# Patient Record
Sex: MALE | Race: WHITE | NOT HISPANIC OR LATINO | Employment: OTHER | URBAN - METROPOLITAN AREA
[De-identification: names, ages, dates, MRNs, and addresses within clinical notes are randomized per-mention and may not be internally consistent; named-entity substitution may affect disease eponyms.]

---

## 2017-08-22 PROBLEM — M50.90 CERVICAL DISC DISEASE: Status: ACTIVE | Noted: 2017-08-22

## 2017-08-22 PROBLEM — G95.0: Status: ACTIVE | Noted: 2017-08-22

## 2017-08-22 PROBLEM — R94.09 ABNORMAL TILT TABLE TEST: Status: ACTIVE | Noted: 2017-08-22

## 2017-08-22 PROBLEM — R79.89 LOW SERUM CORTISOL LEVEL: Status: ACTIVE | Noted: 2017-08-22

## 2017-08-22 PROBLEM — M51.9 LUMBAR DISC DISEASE: Status: ACTIVE | Noted: 2017-08-22

## 2017-11-08 ENCOUNTER — TELEPHONE (OUTPATIENT)
Dept: NEUROSURGERY | Facility: CLINIC | Age: 22
End: 2017-11-08

## 2017-11-08 NOTE — TELEPHONE ENCOUNTER
----- Message from Mag Herr sent at 11/8/2017 12:07 PM CST -----  Regarding: Urgent Appointment Request From Dr. Morenita Hdez  Contact: [phone]  Good Afternoon,    Dr. Hdez would like to speak with Dr. Ruiz regarding her son Alvin's  current condition. The patient has an upper brain stem lesion and saw  Dr. Ruiz a few years ago. He has recently experienced  several epileptic episodes. Dr. Hdez can best be reached at [phone].    Thank you,  Mag Herr   [facility]  [phone]

## 2017-11-29 ENCOUNTER — INITIAL CONSULT (OUTPATIENT)
Dept: NEUROSURGERY | Facility: CLINIC | Age: 22
End: 2017-11-29
Payer: COMMERCIAL

## 2017-11-29 VITALS
WEIGHT: 288.69 LBS | SYSTOLIC BLOOD PRESSURE: 133 MMHG | HEIGHT: 73 IN | HEART RATE: 100 BPM | BODY MASS INDEX: 38.26 KG/M2 | DIASTOLIC BLOOD PRESSURE: 86 MMHG

## 2017-11-29 DIAGNOSIS — M50.90 CERVICAL DISC DISEASE: ICD-10-CM

## 2017-11-29 DIAGNOSIS — R41.3 MEMORY DISORDER: ICD-10-CM

## 2017-11-29 DIAGNOSIS — G95.0: Primary | ICD-10-CM

## 2017-11-29 DIAGNOSIS — R94.09 ABNORMAL TILT TABLE TEST: ICD-10-CM

## 2017-11-29 DIAGNOSIS — M79.7 FIBROMYALGIA: ICD-10-CM

## 2017-11-29 DIAGNOSIS — F68.A MUNCHAUSEN'S SYNDROME BY PROXY: ICD-10-CM

## 2017-11-29 DIAGNOSIS — S06.9X0S TRAUMATIC BRAIN INJURY, WITHOUT LOSS OF CONSCIOUSNESS, SEQUELA: ICD-10-CM

## 2017-11-29 DIAGNOSIS — M51.9 LUMBAR DISC DISEASE: ICD-10-CM

## 2017-11-29 PROBLEM — S06.9XAA TBI (TRAUMATIC BRAIN INJURY): Status: ACTIVE | Noted: 2017-11-29

## 2017-11-29 PROCEDURE — 99999 PR PBB SHADOW E&M-EST. PATIENT-LVL III: CPT | Mod: PBBFAC,,, | Performed by: NEUROLOGICAL SURGERY

## 2017-11-29 PROCEDURE — 99245 OFF/OP CONSLTJ NEW/EST HI 55: CPT | Mod: S$GLB,,, | Performed by: NEUROLOGICAL SURGERY

## 2017-11-29 RX ORDER — SULFAMETHOXAZOLE AND TRIMETHOPRIM 800; 160 MG/1; MG/1
1 TABLET ORAL
COMMUNITY
End: 2018-07-12 | Stop reason: ALTCHOICE
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
## 2017-11-29 NOTE — LETTER
December 5, 2017      Jyotsna Iyer MD  [address] [address] [facility] [place] [address]           [facility]  [address] [facility] [address] [place] [address]  Phone: [phone]  Fax: [phone]          Patient: Alvin Hdez   MR Number: [id number]   YOB: 1995   Date of Visit: 11/29/2017       Dear Dr. Jyotsna Iyer:    Thank you for referring Alvin Hdez to me for evaluation. Attached you will find relevant portions of my assessment and plan of care.    If you have questions, please do not hesitate to call me. I look forward to following Alvin Hdez along with you.    Sincerely,    Chelle Rosario  CC:  No Recipients    If you would like to receive this communication electronically, please contact [email or web address] or [phone] to request more information on [facility] Link access.    For providers and/or their staff who would like to refer a patient to [facility], please contact us through our one-stop-shop provider referral line, [facility], at [phone].    If you feel you have received this communication in error or would no longer like to receive these types of communications, please e-mail [email or web address]

## 2018-01-15 PROBLEM — F68.A: Status: ACTIVE | Noted: 2018-01-15

## 2019-07-24 PROBLEM — Z91.199 NO-SHOW FOR APPOINTMENT: Status: ACTIVE | Noted: 2019-07-24

## 2019-11-19 PROBLEM — Z91.199 NO-SHOW FOR APPOINTMENT: Status: RESOLVED | Noted: 2019-07-24 | Resolved: 2019-11-19

## 2019-11-19 PROBLEM — G61.81 CIDP (CHRONIC INFLAMMATORY DEMYELINATING POLYNEUROPATHY): Status: ACTIVE | Noted: 2019-11-19

## 2020-01-13 ENCOUNTER — TELEPHONE (OUTPATIENT)
Dept: NEUROLOGY | Facility: CLINIC | Age: 25
End: 2020-01-13

## 2020-01-13 NOTE — TELEPHONE ENCOUNTER
----- Message from Candy Jeffrey sent at 1/13/2020  9:56 AM CST -----  Type: Needs Medical Advice    Who Called:  Morenita Teixeira - mom  Best Call Back Number: [phone]  Additional Information: patient referred by Glenny dodson, please contact regarding scheduleing as a new patient

## 2020-01-13 NOTE — TELEPHONE ENCOUNTER
Returned call to pt mom and scheduled consult with Dr. Whitaker in a cancellation for tmw. Date/time/location confirmed. Verbalized understanding.

## 2020-01-14 ENCOUNTER — OFFICE VISIT (OUTPATIENT)
Dept: NEUROLOGY | Facility: CLINIC | Age: 25
End: 2020-01-14
Payer: COMMERCIAL

## 2020-01-14 VITALS
DIASTOLIC BLOOD PRESSURE: 87 MMHG | RESPIRATION RATE: 20 BRPM | BODY MASS INDEX: 40.31 KG/M2 | SYSTOLIC BLOOD PRESSURE: 146 MMHG | WEIGHT: 304.13 LBS | HEIGHT: 73 IN | HEART RATE: 82 BPM

## 2020-01-14 DIAGNOSIS — R53.1 WEAKNESS: ICD-10-CM

## 2020-01-14 DIAGNOSIS — R53.83 FATIGUE, UNSPECIFIED TYPE: ICD-10-CM

## 2020-01-14 DIAGNOSIS — M79.7 FIBROMYALGIA: Primary | ICD-10-CM

## 2020-01-14 DIAGNOSIS — R63.5 WEIGHT GAIN: ICD-10-CM

## 2020-01-14 PROCEDURE — 99999 PR PBB SHADOW E&M-EST. PATIENT-LVL IV: CPT | Mod: PBBFAC,,, | Performed by: PSYCHIATRY & NEUROLOGY

## 2020-01-14 PROCEDURE — 99204 PR OFFICE/OUTPT VISIT, NEW, LEVL IV, 45-59 MIN: ICD-10-PCS | Mod: S$GLB,,, | Performed by: PSYCHIATRY & NEUROLOGY

## 2020-01-14 PROCEDURE — 99999 PR PBB SHADOW E&M-EST. PATIENT-LVL IV: ICD-10-PCS | Mod: PBBFAC,,, | Performed by: PSYCHIATRY & NEUROLOGY

## 2020-01-14 PROCEDURE — 99204 OFFICE O/P NEW MOD 45 MIN: CPT | Mod: S$GLB,,, | Performed by: PSYCHIATRY & NEUROLOGY

## 2020-01-14 RX ORDER — GUAIFENESIN 1200 MG
TABLET, EXTENDED RELEASE 12 HR ORAL
COMMUNITY
End: 2021-02-19
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
## 2020-01-14 NOTE — LETTER
January 30, 2020      Glenny Bass MD  [address] [address]           [facility] Neurology  [address] [place] [address]  Phone: [phone]  Fax: [phone]          Patient: Alvin Hdez   MR Number: [id number]   YOB: 1995   Date of Visit: 1/14/2020       Dear Dr. Glenny Bass:    Thank you for referring Alvin Hdez to me for evaluation. Attached you will find relevant portions of my assessment and plan of care.    If you have questions, please do not hesitate to call me. I look forward to following Alvin Hdez along with you.    Sincerely,    Chelle Rosario  CC:  No Recipients    If you would like to receive this communication electronically, please contact [email or web address] or [phone] to request more information on [facility] Link access.    For providers and/or their staff who would like to refer a patient to [facility], please contact us through our one-stop-shop provider referral line, [facility] , at [phone].    If you feel you have received this communication in error or would no longer like to receive these types of communications, please e-mail [email or web address]

## 2020-01-14 NOTE — PATIENT INSTRUCTIONS
Would advise seeing an endocrinologist before committing to steroids.    Will try Savella for management of pain.  It can take several weeks to start working.  It is a slow titration to avoid side effects.  Possible side effects include nausea, insomnia, fatigue, hypertension, flushing, weight loss, appetite decrease, libido decrease, dry mouth.

## 2020-01-23 ENCOUNTER — TELEPHONE (OUTPATIENT)
Dept: NEUROLOGY | Facility: CLINIC | Age: 25
End: 2020-01-23

## 2020-01-23 NOTE — TELEPHONE ENCOUNTER
Returned call to pt mom; she stated that pt was supposed to have consult with endocrinology; she called to make appt and was told the referral was closed; Dr Whitaker can you please redo the referral? thanks

## 2020-01-23 NOTE — TELEPHONE ENCOUNTER
----- Message from Hilton Castillo sent at 1/23/2020 11:36 AM CST -----  Contact: Pt mother Morenita  Pt mother Morenita called to schedule an appt from the referral, but the referral is closed.    Please call Morenita at [phone]

## 2020-01-28 ENCOUNTER — TELEPHONE (OUTPATIENT)
Dept: NEUROLOGY | Facility: CLINIC | Age: 25
End: 2020-01-28

## 2020-01-28 NOTE — TELEPHONE ENCOUNTER
S/w pts mother- appt scheduled w/ Dr. Sandifer. Advised of date/time/location. Verbalized understanding.

## 2020-01-28 NOTE — TELEPHONE ENCOUNTER
----- Message from Princess BECKY Montenegro sent at 1/28/2020  9:35 AM CST -----  Contact: Dr. Morenita Hdez (Mother)  Type: Needs Medical Advice    Who Called: Dr. Morenita Hdez (Mother)  Best Call Back Number:    Additional Information: Requesting a call from the office in regards to Endo referral please give a call back when avail.

## 2020-02-03 ENCOUNTER — OFFICE VISIT (OUTPATIENT)
Dept: ENDOCRINOLOGY | Facility: CLINIC | Age: 25
End: 2020-02-03
Payer: COMMERCIAL

## 2020-02-03 VITALS
SYSTOLIC BLOOD PRESSURE: 132 MMHG | HEART RATE: 79 BPM | DIASTOLIC BLOOD PRESSURE: 80 MMHG | OXYGEN SATURATION: 99 % | WEIGHT: 300.69 LBS | BODY MASS INDEX: 39.85 KG/M2 | HEIGHT: 73 IN

## 2020-02-03 DIAGNOSIS — R68.89 ABNORMAL ENDOCRINE LABORATORY TEST FINDING: Primary | ICD-10-CM

## 2020-02-03 DIAGNOSIS — G61.81 CIDP (CHRONIC INFLAMMATORY DEMYELINATING POLYNEUROPATHY): Chronic | ICD-10-CM

## 2020-02-03 DIAGNOSIS — E66.9 OBESITY (BMI 35.0-39.9 WITHOUT COMORBIDITY): ICD-10-CM

## 2020-02-03 DIAGNOSIS — R53.83 FATIGUE, UNSPECIFIED TYPE: ICD-10-CM

## 2020-02-03 PROCEDURE — 99999 PR PBB SHADOW E&M-EST. PATIENT-LVL III: ICD-10-PCS | Mod: PBBFAC,,, | Performed by: INTERNAL MEDICINE

## 2020-02-03 PROCEDURE — 99999 PR PBB SHADOW E&M-EST. PATIENT-LVL III: CPT | Mod: PBBFAC,,, | Performed by: INTERNAL MEDICINE

## 2020-02-03 PROCEDURE — 99204 PR OFFICE/OUTPT VISIT, NEW, LEVL IV, 45-59 MIN: ICD-10-PCS | Mod: S$GLB,,, | Performed by: INTERNAL MEDICINE

## 2020-02-03 PROCEDURE — 99204 OFFICE O/P NEW MOD 45 MIN: CPT | Mod: S$GLB,,, | Performed by: INTERNAL MEDICINE

## 2020-02-03 NOTE — LETTER
February 4, 2020      Rona Whitaker, DO  [address]  [facility] [address]           [place] - Endocrinology  [address]  [facility] [address]  Phone: [phone]  Fax: [phone]          Patient: Alvin Hdez   MR Number: [id number]   YOB: 1995   Date of Visit: 2/3/2020       Dear Dr. Rona Whitaker:    Thank you for referring Alvin Hdez to me for evaluation. Attached you will find relevant portions of my assessment and plan of care.    If you have questions, please do not hesitate to call me. I look forward to following Alvin Hdez along with you.    Sincerely,    Juliette L. Sandifer Kum-Nji, MD    Enclosure  CC:  No Recipients    If you would like to receive this communication electronically, please contact [email or web address] or [phone] to request more information on [employer] access.    For providers and/or their staff who would like to refer a patient to [facility], please contact us through our one-stop-shop provider referral line, [facility] [place], operated [facility], at [phone].    If you feel you have received this communication in error or would no longer like to receive these types of communications, please e-mail [email or web address]
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
## 2020-02-04 PROBLEM — R68.89 ABNORMAL ENDOCRINE LABORATORY TEST FINDING: Status: ACTIVE | Noted: 2020-02-04

## 2020-02-04 PROBLEM — R53.83 FATIGUE: Chronic | Status: ACTIVE | Noted: 2020-02-04

## 2020-02-04 PROBLEM — E66.9 OBESITY (BMI 35.0-39.9 WITHOUT COMORBIDITY): Status: ACTIVE | Noted: 2020-02-04

## 2020-02-04 PROBLEM — R53.83 FATIGUE: Status: ACTIVE | Noted: 2020-02-04

## 2020-02-04 PROBLEM — R68.89 ABNORMAL ENDOCRINE LABORATORY TEST FINDING: Chronic | Status: ACTIVE | Noted: 2020-02-04

## 2020-02-04 PROBLEM — E66.9 OBESITY (BMI 35.0-39.9 WITHOUT COMORBIDITY): Chronic | Status: ACTIVE | Noted: 2020-02-04

## 2020-02-04 NOTE — ASSESSMENT & PLAN NOTE
Likely multifactorial. Will screen for AI. Doubt that 3 days of steroids would lead to adrenal suppression. TSH .88 Nov 2019. Needs sleep study due to obesity and snoring.

## 2020-02-04 NOTE — ASSESSMENT & PLAN NOTE
Symptoms seemingly uncontrolled. Pt with chronic pain and significant loss of daily function. F/u with neurology.

## 2020-02-04 NOTE — ASSESSMENT & PLAN NOTE
Low testo x1 mid day. RF includes chronic opioids and obesity. Repeat 8 am testo and do further testing if indicated.

## 2020-02-06 ENCOUNTER — TELEPHONE (OUTPATIENT)
Dept: NEUROLOGY | Facility: CLINIC | Age: 25
End: 2020-02-06

## 2020-02-06 NOTE — TELEPHONE ENCOUNTER
Spoke with pt mother Morenita. States pt just had appointment with endocrinologist and needing to reschedule f/u appt. Informed no available appts until April schedule opens. Appt cancelled. Added to wait list. Verbalized understanding.

## 2020-02-12 ENCOUNTER — TELEPHONE (OUTPATIENT)
Dept: NEUROLOGY | Facility: CLINIC | Age: 25
End: 2020-02-12

## 2021-03-25 ENCOUNTER — OFFICE VISIT (OUTPATIENT)
Dept: DERMATOLOGY | Facility: CLINIC | Age: 26
End: 2021-03-25
Payer: COMMERCIAL

## 2021-03-25 VITALS — BODY MASS INDEX: 28.9 KG/M2 | HEIGHT: 73 IN | WEIGHT: 218.06 LBS | RESPIRATION RATE: 18 BRPM

## 2021-03-25 DIAGNOSIS — D17.1 LIPOMA OF TORSO: Primary | ICD-10-CM

## 2021-03-25 PROCEDURE — 99203 PR OFFICE/OUTPT VISIT, NEW, LEVL III, 30-44 MIN: ICD-10-PCS | Mod: S$GLB,,, | Performed by: DERMATOLOGY

## 2021-03-25 PROCEDURE — 99203 OFFICE O/P NEW LOW 30 MIN: CPT | Mod: S$GLB,,, | Performed by: DERMATOLOGY

## 2021-03-25 PROCEDURE — 99999 PR PBB SHADOW E&M-EST. PATIENT-LVL III: ICD-10-PCS | Mod: PBBFAC,,, | Performed by: DERMATOLOGY

## 2021-03-25 PROCEDURE — 99999 PR PBB SHADOW E&M-EST. PATIENT-LVL III: CPT | Mod: PBBFAC,,, | Performed by: DERMATOLOGY

## 2021-08-25 ENCOUNTER — OFFICE VISIT (OUTPATIENT)
Dept: NEUROSURGERY | Facility: CLINIC | Age: 26
End: 2021-08-25
Payer: COMMERCIAL

## 2021-08-25 VITALS
BODY MASS INDEX: 28.9 KG/M2 | DIASTOLIC BLOOD PRESSURE: 90 MMHG | SYSTOLIC BLOOD PRESSURE: 135 MMHG | HEART RATE: 75 BPM | RESPIRATION RATE: 18 BRPM | HEIGHT: 73 IN | WEIGHT: 218.06 LBS

## 2021-08-25 DIAGNOSIS — G95.0 SYRINX OF SPINAL CORD: ICD-10-CM

## 2021-08-25 DIAGNOSIS — S06.9X5S TRAUMATIC BRAIN INJURY, WITH LOSS OF CONSCIOUSNESS GREATER THAN 24 HOURS WITH RETURN TO PRE-EXISTING CONSCIOUS LEVEL, SEQUELA: ICD-10-CM

## 2021-08-25 DIAGNOSIS — M50.90 CERVICAL DISC DISEASE: Primary | ICD-10-CM

## 2021-08-25 PROCEDURE — 99999 PR PBB SHADOW E&M-EST. PATIENT-LVL III: CPT | Mod: PBBFAC,,, | Performed by: STUDENT IN AN ORGANIZED HEALTH CARE EDUCATION/TRAINING PROGRAM

## 2021-08-25 PROCEDURE — 99204 PR OFFICE/OUTPT VISIT, NEW, LEVL IV, 45-59 MIN: ICD-10-PCS | Mod: S$GLB,,, | Performed by: STUDENT IN AN ORGANIZED HEALTH CARE EDUCATION/TRAINING PROGRAM

## 2021-08-25 PROCEDURE — 99999 PR PBB SHADOW E&M-EST. PATIENT-LVL III: ICD-10-PCS | Mod: PBBFAC,,, | Performed by: STUDENT IN AN ORGANIZED HEALTH CARE EDUCATION/TRAINING PROGRAM

## 2021-08-25 PROCEDURE — 99204 OFFICE O/P NEW MOD 45 MIN: CPT | Mod: S$GLB,,, | Performed by: STUDENT IN AN ORGANIZED HEALTH CARE EDUCATION/TRAINING PROGRAM

## 2021-09-28 PROBLEM — F68.A: Status: RESOLVED | Noted: 2018-01-15 | Resolved: 2021-09-28

## 2022-12-23 ENCOUNTER — TELEPHONE (OUTPATIENT)
Dept: FAMILY MEDICINE | Facility: CLINIC | Age: 27
End: 2022-12-23
Payer: COMMERCIAL

## 2023-03-14 ENCOUNTER — OFFICE VISIT (OUTPATIENT)
Dept: UROLOGY | Facility: CLINIC | Age: 28
End: 2023-03-14
Payer: COMMERCIAL

## 2023-03-14 VITALS — HEIGHT: 73 IN | WEIGHT: 239.44 LBS | BODY MASS INDEX: 31.73 KG/M2

## 2023-03-14 DIAGNOSIS — Z30.09 ENCOUNTER FOR VASECTOMY COUNSELING: ICD-10-CM

## 2023-03-14 LAB
BILIRUBIN, UA POC OHS: NEGATIVE
BLOOD, UA POC OHS: NEGATIVE
CLARITY, UA POC OHS: CLEAR
COLOR, UA POC OHS: YELLOW
GLUCOSE, UA POC OHS: NEGATIVE
KETONES, UA POC OHS: NEGATIVE
LEUKOCYTES, UA POC OHS: NEGATIVE
NITRITE, UA POC OHS: NEGATIVE
PH, UA POC OHS: 7
PROTEIN, UA POC OHS: NEGATIVE
SPECIFIC GRAVITY, UA POC OHS: 1.01
UROBILINOGEN, UA POC OHS: 0.2

## 2023-03-14 PROCEDURE — 99999 PR PBB SHADOW E&M-EST. PATIENT-LVL III: CPT | Mod: PBBFAC,,, | Performed by: STUDENT IN AN ORGANIZED HEALTH CARE EDUCATION/TRAINING PROGRAM

## 2023-03-14 PROCEDURE — 3008F BODY MASS INDEX DOCD: CPT | Mod: CPTII,S$GLB,, | Performed by: STUDENT IN AN ORGANIZED HEALTH CARE EDUCATION/TRAINING PROGRAM

## 2023-03-14 PROCEDURE — 3008F PR BODY MASS INDEX (BMI) DOCUMENTED: ICD-10-PCS | Mod: CPTII,S$GLB,, | Performed by: STUDENT IN AN ORGANIZED HEALTH CARE EDUCATION/TRAINING PROGRAM

## 2023-03-14 PROCEDURE — 99204 OFFICE O/P NEW MOD 45 MIN: CPT | Mod: S$GLB,,, | Performed by: STUDENT IN AN ORGANIZED HEALTH CARE EDUCATION/TRAINING PROGRAM

## 2023-03-14 PROCEDURE — 99204 PR OFFICE/OUTPT VISIT, NEW, LEVL IV, 45-59 MIN: ICD-10-PCS | Mod: S$GLB,,, | Performed by: STUDENT IN AN ORGANIZED HEALTH CARE EDUCATION/TRAINING PROGRAM

## 2023-03-14 PROCEDURE — 1160F PR REVIEW ALL MEDS BY PRESCRIBER/CLIN PHARMACIST DOCUMENTED: ICD-10-PCS | Mod: CPTII,S$GLB,, | Performed by: STUDENT IN AN ORGANIZED HEALTH CARE EDUCATION/TRAINING PROGRAM

## 2023-03-14 PROCEDURE — 1160F RVW MEDS BY RX/DR IN RCRD: CPT | Mod: CPTII,S$GLB,, | Performed by: STUDENT IN AN ORGANIZED HEALTH CARE EDUCATION/TRAINING PROGRAM

## 2023-03-14 PROCEDURE — 81003 POCT URINALYSIS(INSTRUMENT): ICD-10-PCS | Mod: QW,S$GLB,, | Performed by: STUDENT IN AN ORGANIZED HEALTH CARE EDUCATION/TRAINING PROGRAM

## 2023-03-14 PROCEDURE — 1159F PR MEDICATION LIST DOCUMENTED IN MEDICAL RECORD: ICD-10-PCS | Mod: CPTII,S$GLB,, | Performed by: STUDENT IN AN ORGANIZED HEALTH CARE EDUCATION/TRAINING PROGRAM

## 2023-03-14 PROCEDURE — 99999 PR PBB SHADOW E&M-EST. PATIENT-LVL III: ICD-10-PCS | Mod: PBBFAC,,, | Performed by: STUDENT IN AN ORGANIZED HEALTH CARE EDUCATION/TRAINING PROGRAM

## 2023-03-14 PROCEDURE — 1159F MED LIST DOCD IN RCRD: CPT | Mod: CPTII,S$GLB,, | Performed by: STUDENT IN AN ORGANIZED HEALTH CARE EDUCATION/TRAINING PROGRAM

## 2023-03-14 PROCEDURE — 81003 URINALYSIS AUTO W/O SCOPE: CPT | Mod: QW,S$GLB,, | Performed by: STUDENT IN AN ORGANIZED HEALTH CARE EDUCATION/TRAINING PROGRAM

## 2023-03-14 NOTE — PROGRESS NOTES
"[facility] - Urology   Clinic Note    SUBJECTIVE:     Chief Complaint: evaluation for vasectomy    History of Present Illness:  Alvin Hdez is a 27 y.o. male who presents to clinic for evaluation for a vasectomy. He is new to our clinic referred by Dr. Glenny Bass.    He and his partner are currently using OCP for contraception, but she has had many side effects and would like to discontinue OCP.  They report he was previously told he would have trouble with fertility due to a semen analysis many years ago. They had 1 successful conception but this was a miscarriage.  He has no children.   They are potentially interested in having children later on.    He reports no previous scrotal surgeries.    He does not work - he is disabled.     OBJECTIVE:     Estimated body mass index is 31.59 kg/m² as calculated from the following:    Height as of this encounter: 6' 1" (1.854 m).    Weight as of this encounter: 108.6 kg (239 lb 6.7 oz).    Vital Signs (Most Recent)       Physical Exam  Vitals and nursing note reviewed.   Constitutional:       General: He is not in acute distress.     Appearance: He is not ill-appearing or toxic-appearing.   Pulmonary:      Effort: Pulmonary effort is normal. No accessory muscle usage or respiratory distress.   Neurological:      Mental Status: He is alert.       Lab Results   Component Value Date    BUN 15 02/26/2022    CREATININE 0.78 02/26/2022    WBC 16.73 (H) 02/26/2022    HGB 18.0 02/26/2022    HCT 53.3 02/26/2022     02/26/2022    AST 38 02/26/2022    ALT 24 02/26/2022    ALKPHOS 27 (L) 02/26/2022    ALBUMIN 5.6 (H) 02/26/2022    HGBA1C 5.7 (H) 11/19/2019          ASSESSMENT     1. Encounter for vasectomy counseling      PLAN:   Alvin was seen today for sterilization.    Diagnoses and all orders for this visit:    Encounter for vasectomy counseling  -     Ambulatory referral/consult to Urology  -     POCT Urinalysis(Instrument)       We had a long discussion " about vasectomy and its implications, specifically regarding the options for conception afterward. Given that they are considering children in the future, I would recommend other non-permanent forms of contraception.     - Vasectomy is intended to be a permanent form of contraception.  - Options for fertility after vasectomy include vasectomy reversal and sperm retrieval with in vitro fertilization. These options are not always successful, and they may be expensive.  - Vasectomy does not produce immediate sterility.  - Following vasectomy, another form of contraception is required until vas occlusion is confirmed by post-vasectomy semen analysis (PVSA).  - Even after vas occlusion is confirmed, vasectomy is not 100% reliable in preventing pregnancy.  - The risk of pregnancy after vasectomy is approximately 1 in 2,000 for men who have post-vasectomy azoospermia or PVSA showing rare non-motile sperm (RNMS).  - Repeat vasectomy is necessary in ?1% of vasectomies, provided that a technique for vas occlusion known to have a low occlusive failure rate has been used.  - Patients should refrain from ejaculation for approximately one week after vasectomy.  - The rates of surgical complications such as symptomatic hematoma and infection are 1-2%. These rates vary with the surgeon's experience and the criteria used to diagnose these conditions.  - Chronic scrotal pain associated with negative impact on quality of life occurs after vasectomy in about 1-2% of men. Few of these men require additional surgery.  - Other permanent and non-permanent alternatives to vasectomy are available.     Celestino Reich MD

## 2024-09-25 NOTE — ASSESSMENT & PLAN NOTE
Likely due to excess caloric intake and sedentary lifestyle. Encouraged increased physical activity and healthy diet.    4-10 METS

## 2024-11-26 ENCOUNTER — OFFICE VISIT (OUTPATIENT)
Dept: UROLOGY | Facility: CLINIC | Age: 29
End: 2024-11-26
Payer: COMMERCIAL

## 2024-11-26 VITALS — WEIGHT: 270.75 LBS | BODY MASS INDEX: 35.88 KG/M2 | HEIGHT: 73 IN

## 2024-11-26 DIAGNOSIS — N52.8 OTHER MALE ERECTILE DYSFUNCTION: ICD-10-CM

## 2024-11-26 DIAGNOSIS — F52.32 DELAYED EJACULATION: Primary | ICD-10-CM

## 2024-11-26 PROCEDURE — 3008F BODY MASS INDEX DOCD: CPT | Mod: CPTII,S$GLB,, | Performed by: STUDENT IN AN ORGANIZED HEALTH CARE EDUCATION/TRAINING PROGRAM

## 2024-11-26 PROCEDURE — 99213 OFFICE O/P EST LOW 20 MIN: CPT | Mod: S$GLB,,, | Performed by: STUDENT IN AN ORGANIZED HEALTH CARE EDUCATION/TRAINING PROGRAM

## 2024-11-26 PROCEDURE — 1159F MED LIST DOCD IN RCRD: CPT | Mod: CPTII,S$GLB,, | Performed by: STUDENT IN AN ORGANIZED HEALTH CARE EDUCATION/TRAINING PROGRAM

## 2024-11-26 PROCEDURE — 99999 PR PBB SHADOW E&M-EST. PATIENT-LVL III: CPT | Mod: PBBFAC,,, | Performed by: STUDENT IN AN ORGANIZED HEALTH CARE EDUCATION/TRAINING PROGRAM

## 2024-11-26 RX ORDER — HYDROCODONE BITARTRATE AND ACETAMINOPHEN 10; 325 MG/1; MG/1
1 TABLET ORAL
COMMUNITY

## 2024-11-26 RX ORDER — TADALAFIL 20 MG/1
20 TABLET ORAL
COMMUNITY

## 2024-11-26 RX ORDER — ONDANSETRON HYDROCHLORIDE 8 MG/1
8 TABLET, FILM COATED ORAL 3 TIMES DAILY
COMMUNITY
Start: 2024-11-13

## 2024-11-26 RX ORDER — DULOXETINE HYDROCHLORIDE 60 MG/1
60 CAPSULE, DELAYED RELEASE ORAL
COMMUNITY
Start: 2024-11-13

## 2024-11-26 RX ORDER — TOPIRAMATE 25 MG/1
25 TABLET ORAL 2 TIMES DAILY
COMMUNITY
Start: 2024-11-13

## 2024-11-26 NOTE — PROGRESS NOTES
"[facility] - Urology   Clinic Note    Subjective:     Chief Complaint: Sterilization, Advice Only, and Erectile Dysfunction      History of Present Illness    Alvin presents today for follow-up on medication changes and sexual health concerns.    He reports difficulty maintaining an erection. Tadalafil, taken as needed but is not entirely effective for erectile function. He experiences significant difficulty with ejaculation, often taking more than an hour of sexual activity without reaching climax. When ejaculation does occur, it is described as minimal. He notes improvement in his ability to ejaculate when not taking hydrocodone. He also reports penile discomfort, specifically tearing around the glans penis. This discomfort is exacerbated by extended periods of sexual intercourse.    He is currently taking Cymbalta (SSRI) and hydrocodone. The hydrocodone dosage was recently increased to 3 times daily a few weeks ago, up from daily previously.     He expresses concern about his fertility status, particularly regarding his sperm count. He reports a history of low sperm count and is interested in assessing his current fertility status. He indicates a desire to have the option of having children in the future.          Past medical, family, surgical and social history reviewed as documented in chart with pertinent positive medical, family, surgical and social history detailed in HPI.    A review systems was conducted with pertinent positive and negative findings documented in HPI.    Objective:     Estimated body mass index is 35.72 kg/m² as calculated from the following:    Height as of this encounter: 6' 1" (1.854 m).    Weight as of this encounter: 122.8 kg (270 lb 11.6 oz).    Vital Signs (Most Recent)       General: No acute distress, well developed.   Head: Normocephalic, atraumatic  CV: no cyanosis  Lungs: normal respiratory effort, no respiratory distress    Labs reviewed below:  Lab Results   Component " Value Date    BUN 15 01/30/2024    CREATININE 0.96 01/30/2024    WBC 10.94 01/30/2024    HGB 13.4 (L) 01/30/2024    HCT 39.0 (L) 01/30/2024     01/30/2024    AST 40 01/30/2024    ALT 43 01/30/2024    ALKPHOS 38 01/30/2024    ALBUMIN 4.5 01/30/2024    HGBA1C 5.4 07/24/2023        Assessment:     1. Delayed ejaculation    2. Other male erectile dysfunction      Plan:     Assessment & Plan    - Assessed patient's erectile dysfunction and ejaculation issues, likely related to multiple medications including antidepressants (SSRI - Cymbalta) and pain medication (hydrocodone).  - Explained potential side effects of SSRIs and hydrocodone on sexual function, including delayed ejaculation.  - Discussed how antidepressants and pain medications can compound sexual side effects when used in combination.  - Changed tadalafil 20 mg: Take 3 times per week (Monday, Wednesday, Friday) instead of as needed.    - Contacted prescribing physician to discuss potential medication regimen adjustments to address sexual side effects    - Reviewed fertility concerns, deemed formal semen analysis unnecessary at this time.          Portions of this note were generated by [employer] and [employer] Direct dictation services    Celestino Reich MD